# Patient Record
Sex: FEMALE | Race: ASIAN | Employment: FULL TIME | URBAN - METROPOLITAN AREA
[De-identification: names, ages, dates, MRNs, and addresses within clinical notes are randomized per-mention and may not be internally consistent; named-entity substitution may affect disease eponyms.]

---

## 2017-03-01 ENCOUNTER — OFFICE VISIT (OUTPATIENT)
Dept: FAMILY MEDICINE CLINIC | Facility: CLINIC | Age: 22
End: 2017-03-01

## 2017-03-01 ENCOUNTER — TELEPHONE (OUTPATIENT)
Dept: FAMILY MEDICINE CLINIC | Facility: CLINIC | Age: 22
End: 2017-03-01

## 2017-03-01 VITALS
WEIGHT: 107 LBS | RESPIRATION RATE: 18 BRPM | HEIGHT: 63 IN | BODY MASS INDEX: 18.96 KG/M2 | HEART RATE: 92 BPM | TEMPERATURE: 98 F | OXYGEN SATURATION: 98 % | DIASTOLIC BLOOD PRESSURE: 62 MMHG | SYSTOLIC BLOOD PRESSURE: 98 MMHG

## 2017-03-01 DIAGNOSIS — H60.392 INFECTION OF EAR LOBE, LEFT: Primary | ICD-10-CM

## 2017-03-01 PROCEDURE — 99213 OFFICE O/P EST LOW 20 MIN: CPT | Performed by: PHYSICIAN ASSISTANT

## 2017-03-01 RX ORDER — CEPHALEXIN 500 MG/1
500 CAPSULE ORAL 3 TIMES DAILY
Qty: 21 CAPSULE | Refills: 0 | Status: SHIPPED | OUTPATIENT
Start: 2017-03-01 | End: 2017-03-08

## 2017-03-01 NOTE — TELEPHONE ENCOUNTER
Pt called, thinks she may have ear infection  Asking for appt any time after 4pm.  Advised pt that Dr Dylan Craven doesn't have any appts in that time frame  Gave pt information regarding [facility] walk in clinics

## 2017-03-02 NOTE — PATIENT INSTRUCTIONS
Pierced-Ear Infection  A pierced ear can get swollen and sore. This is often due to an infection.  The possible causes for this infection include:  · Frequently touching the earlobes with dirty hands  · Ear-piercing equipment was not sterile  · Earring po · Redness and swelling don't start to get better after 2 days of treatment  · Fluid draining from your earlobe  · Not able to see the front or back side of the earrings due to swelling  For fever, call your provider right away if:  · You have a fever over

## 2017-03-02 NOTE — PROGRESS NOTES
CHIEF COMPLAINT:   Patient presents with:  Ear Pain: Left ear lobe pain at piercing for 3 days, pierced 3 weeks prior  Redness  Swelling      HPI:   beny Sky Aas is a 24year old female who presents with concerns of left ear lobe infection.  Patient first place.     Right ear lobe normal without erythema or swelling but also small soft tissue granuloma.   EYES: conjunctiva clear, sclera white  NECK: supple, non-tender  LUNGS: CTA without R/R/W  CARDIO: S1/S2 RRR   LYMPH: no lymphadenopathy or lymphangitic st

## 2017-03-30 ENCOUNTER — OFFICE VISIT (OUTPATIENT)
Dept: FAMILY MEDICINE CLINIC | Facility: CLINIC | Age: 22
End: 2017-03-30

## 2017-03-30 VITALS
OXYGEN SATURATION: 98 % | HEIGHT: 63 IN | BODY MASS INDEX: 18.96 KG/M2 | TEMPERATURE: 98 F | RESPIRATION RATE: 18 BRPM | SYSTOLIC BLOOD PRESSURE: 112 MMHG | HEART RATE: 67 BPM | WEIGHT: 107 LBS | DIASTOLIC BLOOD PRESSURE: 68 MMHG

## 2017-03-30 DIAGNOSIS — H60.392 INFECTION OF EAR LOBE, LEFT: Primary | ICD-10-CM

## 2017-03-30 PROCEDURE — 87205 SMEAR GRAM STAIN: CPT | Performed by: NURSE PRACTITIONER

## 2017-03-30 PROCEDURE — 87077 CULTURE AEROBIC IDENTIFY: CPT | Performed by: NURSE PRACTITIONER

## 2017-03-30 PROCEDURE — 99213 OFFICE O/P EST LOW 20 MIN: CPT | Performed by: NURSE PRACTITIONER

## 2017-03-30 PROCEDURE — 87070 CULTURE OTHR SPECIMN AEROBIC: CPT | Performed by: NURSE PRACTITIONER

## 2017-03-30 PROCEDURE — 87186 SC STD MICRODIL/AGAR DIL: CPT | Performed by: NURSE PRACTITIONER

## 2017-03-30 RX ORDER — SULFAMETHOXAZOLE AND TRIMETHOPRIM 800; 160 MG/1; MG/1
1 TABLET ORAL 2 TIMES DAILY
Qty: 14 TABLET | Refills: 0 | Status: SHIPPED | OUTPATIENT
Start: 2017-03-30 | End: 2017-04-06

## 2017-03-30 NOTE — PROGRESS NOTES
CHIEF COMPLAINT:   Patient presents with:  Ear Pain: Left earlobe, ongoin issue      HPI:   Holly Galdamez is a 24year old female who presents to clinic today with complaints of left ear lobe pain.  She has had a recent skin infection to the recent piercing EARS: Tragus non tender on palpation bilaterally. +Ear piercings bilaterally. Right ear lobe normal.  Left ear lobe w/ erythema, warmth, mildly tender and mildly edematous.  Upon gentle sliding of the earring, small amount of purulent drainage is expressed A pierced ear can get swollen and sore. This is often due to an infection.  The possible causes for this infection include:  · Frequently touching the earlobes with dirty hands  · Ear-piercing equipment was not sterile  · Earring posts were not sterile  · P · Redness and swelling don't start to get better after 2 days of treatment  · Fluid draining from your earlobe  · Not able to see the front or back side of the earrings due to swelling  For fever, call your provider right away if:  · You have a fever over

## 2017-05-22 ENCOUNTER — TELEPHONE (OUTPATIENT)
Dept: FAMILY MEDICINE CLINIC | Facility: CLINIC | Age: 22
End: 2017-05-22

## 2017-05-22 NOTE — TELEPHONE ENCOUNTER
Spoke with pt, has form but found out that a physical is optional    Pt just needs immunization section completed    $15 form fee, pt will bring in tomorrow afternoon and ask to speak with Alma Ramírez

## 2017-05-22 NOTE — TELEPHONE ENCOUNTER
Patient called to cancel appt for tomorrow's physical.  She wants to know if she can  a copy of medical history form? She also wants to drop off a form. She wants to know if she has to be seen? Transferred to triage.

## 2017-07-16 ENCOUNTER — OFFICE VISIT (OUTPATIENT)
Dept: FAMILY MEDICINE CLINIC | Facility: CLINIC | Age: 22
End: 2017-07-16

## 2017-07-16 VITALS
BODY MASS INDEX: 18.96 KG/M2 | DIASTOLIC BLOOD PRESSURE: 60 MMHG | HEART RATE: 66 BPM | OXYGEN SATURATION: 98 % | SYSTOLIC BLOOD PRESSURE: 110 MMHG | TEMPERATURE: 98 F | WEIGHT: 107 LBS | RESPIRATION RATE: 18 BRPM | HEIGHT: 63 IN

## 2017-07-16 DIAGNOSIS — H66.003 ACUTE SUPPURATIVE OTITIS MEDIA OF BOTH EARS WITHOUT SPONTANEOUS RUPTURE OF TYMPANIC MEMBRANES, RECURRENCE NOT SPECIFIED: Primary | ICD-10-CM

## 2017-07-16 PROCEDURE — 99213 OFFICE O/P EST LOW 20 MIN: CPT | Performed by: NURSE PRACTITIONER

## 2017-07-16 RX ORDER — AMOXICILLIN 875 MG/1
875 TABLET, COATED ORAL 2 TIMES DAILY
Qty: 20 TABLET | Refills: 0 | Status: SHIPPED | OUTPATIENT
Start: 2017-07-16 | End: 2017-07-26

## 2017-07-16 NOTE — PROGRESS NOTES
CHIEF COMPLAINT:   Patient presents with:  Ear Pain: right ear pain x 4 days  Blister on thumb and lips x 6 month on and off      HPI:   Freddy Baeza is a 24year old female who presents to clinic today with complaints of right ear pain.  Has had for 4  da EARS: wnl tragus no tender with manipulation. External auditory canals wnl. Right TM: injected with erythema, pos bulging, noretraction,poseffusion, bony landmarks not visible.   Left TM: injected with erythema, pos bulging, no retraction,pos effusion, bon You have an infection of the middle ear, the space behind the eardrum. This is also called acute otitis media (AOM). Sometimes it is caused by the common cold.  This is because congestion can block the internal passage (eustachian tube) that drains fluid fr

## 2018-09-24 ENCOUNTER — OFFICE VISIT (OUTPATIENT)
Dept: FAMILY MEDICINE CLINIC | Facility: CLINIC | Age: 23
End: 2018-09-24
Payer: COMMERCIAL

## 2018-09-24 VITALS
SYSTOLIC BLOOD PRESSURE: 104 MMHG | RESPIRATION RATE: 15 BRPM | OXYGEN SATURATION: 97 % | BODY MASS INDEX: 19.16 KG/M2 | HEIGHT: 62.5 IN | WEIGHT: 106.81 LBS | HEART RATE: 88 BPM | TEMPERATURE: 99 F | DIASTOLIC BLOOD PRESSURE: 66 MMHG

## 2018-09-24 DIAGNOSIS — L30.9 CHRONIC DERMATITIS OF HANDS: ICD-10-CM

## 2018-09-24 DIAGNOSIS — Z13.29 SCREENING FOR THYROID DISORDER: ICD-10-CM

## 2018-09-24 DIAGNOSIS — M54.9 CHRONIC MIDLINE BACK PAIN, UNSPECIFIED BACK LOCATION: ICD-10-CM

## 2018-09-24 DIAGNOSIS — Z13.0 SCREENING FOR DEFICIENCY ANEMIA: ICD-10-CM

## 2018-09-24 DIAGNOSIS — G89.29 CHRONIC MIDLINE BACK PAIN, UNSPECIFIED BACK LOCATION: ICD-10-CM

## 2018-09-24 DIAGNOSIS — Z23 NEED FOR TDAP VACCINATION: ICD-10-CM

## 2018-09-24 DIAGNOSIS — Z13.220 SCREENING, LIPID: ICD-10-CM

## 2018-09-24 DIAGNOSIS — Z00.00 WELL ADULT EXAM: Primary | ICD-10-CM

## 2018-09-24 DIAGNOSIS — Z13.1 SCREENING FOR DIABETES MELLITUS: ICD-10-CM

## 2018-09-24 PROCEDURE — 99395 PREV VISIT EST AGE 18-39: CPT | Performed by: FAMILY MEDICINE

## 2018-09-24 PROCEDURE — 90715 TDAP VACCINE 7 YRS/> IM: CPT | Performed by: FAMILY MEDICINE

## 2018-09-24 PROCEDURE — 99213 OFFICE O/P EST LOW 20 MIN: CPT | Performed by: FAMILY MEDICINE

## 2018-09-24 PROCEDURE — 90471 IMMUNIZATION ADMIN: CPT | Performed by: FAMILY MEDICINE

## 2018-09-24 RX ORDER — BETAMETHASONE DIPROPIONATE 0.5 MG/G
1 OINTMENT TOPICAL 2 TIMES DAILY
Qty: 30 G | Refills: 3 | Status: SHIPPED | OUTPATIENT
Start: 2018-09-24 | End: 2020-07-24 | Stop reason: ALTCHOICE

## 2018-09-24 NOTE — PATIENT INSTRUCTIONS
Betamethasone thin layer applied to affected areas of skin twice daily for 2-3 weeks as needed for skin condition on hands.     Vaseline or other moisturizer (Eucerin, Lubriderm or Candace lotion brands, intensive repair options)     Low back exercises and str

## 2018-09-25 PROBLEM — L30.9 CHRONIC DERMATITIS OF HANDS: Status: ACTIVE | Noted: 2018-09-25

## 2018-09-25 PROBLEM — M54.9 CHRONIC MIDLINE BACK PAIN: Status: ACTIVE | Noted: 2018-09-25

## 2018-09-25 PROBLEM — G89.29 CHRONIC MIDLINE BACK PAIN: Status: ACTIVE | Noted: 2018-09-25

## 2018-10-01 ENCOUNTER — TELEPHONE (OUTPATIENT)
Dept: FAMILY MEDICINE CLINIC | Facility: CLINIC | Age: 23
End: 2018-10-01

## 2018-10-01 DIAGNOSIS — Z13.29 SCREENING FOR THYROID DISORDER: ICD-10-CM

## 2018-10-01 DIAGNOSIS — Z13.220 SCREENING FOR HYPERLIPIDEMIA: ICD-10-CM

## 2018-10-01 DIAGNOSIS — Z13.0 SCREENING FOR DEFICIENCY ANEMIA: Primary | ICD-10-CM

## 2018-10-01 DIAGNOSIS — Z13.1 SCREENING FOR DIABETES MELLITUS: ICD-10-CM

## 2020-07-23 ENCOUNTER — HOSPITAL ENCOUNTER (OUTPATIENT)
Age: 25
Discharge: HOME | End: 2020-07-23
Payer: COMMERCIAL

## 2020-07-23 VITALS
TEMPERATURE: 99 F | RESPIRATION RATE: 20 BRPM | WEIGHT: 110 LBS | HEIGHT: 63 IN | SYSTOLIC BLOOD PRESSURE: 126 MMHG | BODY MASS INDEX: 19.49 KG/M2 | DIASTOLIC BLOOD PRESSURE: 89 MMHG | HEART RATE: 94 BPM | OXYGEN SATURATION: 98 %

## 2020-07-23 DIAGNOSIS — R31.9 HEMATURIA, UNSPECIFIED TYPE: Primary | ICD-10-CM

## 2020-07-23 LAB
POCT BILIRUBIN URINE: NEGATIVE
POCT GLUCOSE URINE: NEGATIVE MG/DL
POCT KETONE URINE: NEGATIVE MG/DL
POCT NITRITE URINE: NEGATIVE
POCT PH URINE: 6.5 (ref 5–8)
POCT PROTEIN URINE: NEGATIVE MG/DL
POCT SPECIFIC GRAVITY URINE: 1.01
POCT URINE PREGNANCY: NEGATIVE
POCT UROBILINOGEN URINE: 0.2 MG/DL

## 2020-07-23 PROCEDURE — 81025 URINE PREGNANCY TEST: CPT

## 2020-07-23 PROCEDURE — 87086 URINE CULTURE/COLONY COUNT: CPT | Performed by: PHYSICIAN ASSISTANT

## 2020-07-23 PROCEDURE — 99214 OFFICE O/P EST MOD 30 MIN: CPT

## 2020-07-23 PROCEDURE — 81002 URINALYSIS NONAUTO W/O SCOPE: CPT

## 2020-07-23 PROCEDURE — 99204 OFFICE O/P NEW MOD 45 MIN: CPT

## 2020-07-23 RX ORDER — CEPHALEXIN 500 MG/1
500 CAPSULE ORAL 3 TIMES DAILY
Qty: 15 CAPSULE | Refills: 0 | Status: SHIPPED | OUTPATIENT
Start: 2020-07-23 | End: 2020-07-28

## 2020-07-24 ENCOUNTER — OFFICE VISIT (OUTPATIENT)
Dept: FAMILY MEDICINE CLINIC | Facility: CLINIC | Age: 25
End: 2020-07-24
Payer: COMMERCIAL

## 2020-07-24 ENCOUNTER — HOSPITAL ENCOUNTER (OUTPATIENT)
Dept: ULTRASOUND IMAGING | Age: 25
Discharge: HOME | End: 2020-07-24
Attending: FAMILY MEDICINE
Payer: COMMERCIAL

## 2020-07-24 ENCOUNTER — TELEPHONE (OUTPATIENT)
Dept: FAMILY MEDICINE CLINIC | Facility: CLINIC | Age: 25
End: 2020-07-24

## 2020-07-24 VITALS
TEMPERATURE: 98 F | HEART RATE: 102 BPM | OXYGEN SATURATION: 98 % | DIASTOLIC BLOOD PRESSURE: 72 MMHG | RESPIRATION RATE: 16 BRPM | SYSTOLIC BLOOD PRESSURE: 100 MMHG | WEIGHT: 104 LBS | BODY MASS INDEX: 18.43 KG/M2 | HEIGHT: 63 IN

## 2020-07-24 DIAGNOSIS — R10.9 RIGHT LATERAL ABDOMINAL PAIN: ICD-10-CM

## 2020-07-24 DIAGNOSIS — R31.0 GROSS HEMATURIA: ICD-10-CM

## 2020-07-24 DIAGNOSIS — R31.0 GROSS HEMATURIA: Primary | ICD-10-CM

## 2020-07-24 LAB
APPEARANCE: CLEAR
BILIRUB UR QL STRIP.AUTO: NEGATIVE
BILIRUBIN: NEGATIVE
CLARITY UR REFRACT.AUTO: CLEAR
GLUCOSE (URINE DIPSTICK): NEGATIVE MG/DL
GLUCOSE UR STRIP.AUTO-MCNC: NEGATIVE MG/DL
KETONES (URINE DIPSTICK): NEGATIVE MG/DL
KETONES UR STRIP.AUTO-MCNC: NEGATIVE MG/DL
MULTISTIX LOT#: NORMAL NUMERIC
NITRITE UR QL STRIP.AUTO: NEGATIVE
NITRITE, URINE: NEGATIVE
PH UR STRIP.AUTO: 6 [PH] (ref 4.5–8)
PH, URINE: 6 (ref 4.5–8)
PROT UR STRIP.AUTO-MCNC: NEGATIVE MG/DL
PROTEIN (URINE DIPSTICK): NEGATIVE MG/DL
SP GR UR STRIP.AUTO: <1.005 (ref 1–1.03)
SPECIFIC GRAVITY: 1.01 (ref 1–1.03)
URINE-COLOR: YELLOW
UROBILINOGEN UR STRIP.AUTO-MCNC: <2 MG/DL
UROBILINOGEN,SEMI-QN: 0.2 MG/DL (ref 0–1.9)

## 2020-07-24 PROCEDURE — 3074F SYST BP LT 130 MM HG: CPT | Performed by: FAMILY MEDICINE

## 2020-07-24 PROCEDURE — 3078F DIAST BP <80 MM HG: CPT | Performed by: FAMILY MEDICINE

## 2020-07-24 PROCEDURE — 76770 US EXAM ABDO BACK WALL COMP: CPT | Performed by: FAMILY MEDICINE

## 2020-07-24 PROCEDURE — 3008F BODY MASS INDEX DOCD: CPT | Performed by: FAMILY MEDICINE

## 2020-07-24 PROCEDURE — 99213 OFFICE O/P EST LOW 20 MIN: CPT | Performed by: FAMILY MEDICINE

## 2020-07-24 PROCEDURE — 81003 URINALYSIS AUTO W/O SCOPE: CPT | Performed by: FAMILY MEDICINE

## 2020-07-24 PROCEDURE — 81001 URINALYSIS AUTO W/SCOPE: CPT | Performed by: FAMILY MEDICINE

## 2020-07-24 NOTE — ED INITIAL ASSESSMENT (HPI)
Patient presents with cc of hematuria today(BRB)abd and RLQ abdominal pain that presented intermittently today a 1am and 5pm today. Denies nausea,vomiting or diarrhea. No BM x 2 days. Also for past month or so has felt lightheaded when standing up.

## 2020-07-24 NOTE — PROGRESS NOTES
Elton Garcia IS A 21year old female [address] [facility] [place] Patient presents with:  Urgent Care F/u: for blood in urine and lower abdominal pain.         History of present illness:     1 AM had some discomfort (sharp pain R side) then again 5 PM.     Urgency felt at 5 PM Physical activity:        Days per week: Not on file        Minutes per session: Not on file      Stress: Not on file    Relationships      Social connections:        Talks on phone: Not on file        Gets together: Not on file        Attends [employer] • PH, Urine 07/23/2020 6.5    • Protein urine 07/23/2020 Negative    • Urobilinogen urine 07/23/2020 0.2    • Nitrite Urine 07/23/2020 Negative    • Leukocyte esterase urine 07/23/2020 Trace*   • POCT Urine Pregnancy 07/23/2020 Negative          Assessment · Kidney or bladder stones. These are collections of crystals. They form in the urine. Stones may be found anywhere in the urinary tract. But they form most often in the kidneys or bladder. In addition to blood in the urine, they can cause severe pain.   ·

## 2020-07-24 NOTE — ED PROVIDER NOTES
Patient Seen in: [facility]      History   Patient presents with:  Urinary Symptoms  Abdominal Pain  Lightheadedness    Stated Complaint: BLOOD IN URINE/ ABDOMINAL PAIN X 2 DAYS    HPI    Leah Alfaro is a 26-year-old female who pres (Approximate)   SpO2 98%   BMI 19.49 kg/m²         Physical Exam    Nursing note reviewed. Vital signs reviewed. Constitutional: Oriented to person, place, and time. Patient appears well-developed and well-nourished, non-toxic and in no acute distress.   H patient is completely stable in the urgent care and is not in any pain currently. The patient is encouraged to return if any concerning symptoms arise. Additional verbal discharge instructions are given and discussed. Discharge medications are discussed.

## 2020-07-24 NOTE — TELEPHONE ENCOUNTER
Future Appointments   Date Time Provider Leisa Angeles   7/24/2020  1:20 PM Natalie Trent MD [facility]     Yes this is OK

## 2020-07-24 NOTE — PATIENT INSTRUCTIONS
I suspect you had a kidney stone. we will check blood tests: complete blood count and kidney function.  Call [phone] to schedule toe lab test.     Urinalysis yesterday suggested non-infectious bleeding, but will check  the culture in process (ordered b urinary tract may cause blood in the urine. This damage may be due to a blow or accident. It may also result from the use of a urinary catheter. Very hard exercise may sometimes irritate the urinary tract and cause bleeding.   · Cancer may occur anywhere in

## 2020-07-24 NOTE — TELEPHONE ENCOUNTER
Patient placed themselves on the schedule for today with dr Xiomara Koch for blood in urine, patient said she went to UC last night and was told to see  today       Is this ok to keep on , and should it be longer the 20 minutes ? ??

## 2020-07-26 LAB
ABSOLUTE BASOPHILS: 28 CELLS/UL (ref 0–200)
ABSOLUTE EOSINOPHILS: 38 CELLS/UL (ref 15–500)
ABSOLUTE LYMPHOCYTES: 1955 CELLS/UL (ref 850–3900)
ABSOLUTE MONOCYTES: 357 CELLS/UL (ref 200–950)
ABSOLUTE NEUTROPHILS: 2322 CELLS/UL (ref 1500–7800)
BASOPHILS: 0.6 %
BUN/CREATININE RATIO: 9 (CALC) (ref 6–22)
BUN: 6 MG/DL (ref 7–25)
CALCIUM: 9.2 MG/DL (ref 8.6–10.2)
CARBON DIOXIDE: 28 MMOL/L (ref 20–32)
CHLORIDE: 102 MMOL/L (ref 98–110)
CREATININE: 0.68 MG/DL (ref 0.5–1.1)
EGFR IF AFRICN AM: 141 ML/MIN/1.73M2
EGFR IF NONAFRICN AM: 122 ML/MIN/1.73M2
EOSINOPHILS: 0.8 %
GLUCOSE: 84 MG/DL (ref 65–99)
HEMATOCRIT: 42.5 % (ref 35–45)
HEMOGLOBIN: 14.2 G/DL (ref 11.7–15.5)
LYMPHOCYTES: 41.6 %
MCH: 31.3 PG (ref 27–33)
MCHC: 33.4 G/DL (ref 32–36)
MCV: 93.6 FL (ref 80–100)
MONOCYTES: 7.6 %
MPV: 11.3 FL (ref 7.5–12.5)
NEUTROPHILS: 49.4 %
PLATELET COUNT: 247 THOUSAND/UL (ref 140–400)
POTASSIUM: 4.2 MMOL/L (ref 3.5–5.3)
RDW: 11.4 % (ref 11–15)
RED BLOOD CELL COUNT: 4.54 MILLION/UL (ref 3.8–5.1)
SODIUM: 138 MMOL/L (ref 135–146)
WHITE BLOOD CELL COUNT: 4.7 THOUSAND/UL (ref 3.8–10.8)

## 2020-07-28 ENCOUNTER — TELEPHONE (OUTPATIENT)
Dept: FAMILY MEDICINE CLINIC | Facility: CLINIC | Age: 25
End: 2020-07-28

## 2020-07-28 NOTE — TELEPHONE ENCOUNTER
Called patient and informed  has reviewed all test results and they are normal and do not show any infection. There is not a reason to come to office. Nothing else we can do for her here.    Dr. Renata Eddy recommendation is to go to ED for further ev

## 2022-12-30 ENCOUNTER — HOSPITAL ENCOUNTER (OUTPATIENT)
Age: 27
Discharge: HOME | End: 2022-12-30
Payer: COMMERCIAL

## 2022-12-30 VITALS
OXYGEN SATURATION: 98 % | DIASTOLIC BLOOD PRESSURE: 76 MMHG | SYSTOLIC BLOOD PRESSURE: 117 MMHG | RESPIRATION RATE: 16 BRPM | TEMPERATURE: 98 F | HEART RATE: 98 BPM

## 2022-12-30 DIAGNOSIS — N61.0 CELLULITIS OF RIGHT BREAST: Primary | ICD-10-CM

## 2022-12-30 PROCEDURE — 99213 OFFICE O/P EST LOW 20 MIN: CPT

## 2022-12-30 RX ORDER — CEPHALEXIN 500 MG/1
500 CAPSULE ORAL 4 TIMES DAILY
Qty: 40 CAPSULE | Refills: 0 | Status: SHIPPED | OUTPATIENT
Start: 2022-12-30 | End: 2023-01-09

## 2022-12-30 NOTE — ED INITIAL ASSESSMENT (HPI)
Patient presents to IC with c/o right breast pain and redness x 4 days. Squeezed a pimple and got some purulent drainage. No fever.

## 2022-12-30 NOTE — DISCHARGE INSTRUCTIONS
Take Keflex as prescribed. Warm compresses. Tylenol and ibuprofen for pain. Follow closely with your primary. ER if worse.

## (undated) NOTE — MR AVS SNAPSHOT
[facility]  [address] [facility]  [place] [address]  [phone]               Thank you for choosing us for your health care visit with TUNDE Pederson. We are glad to serve you and happy to provide you with this summary of your visit.   Darin · Always clean the earrings, posts, and your earlobe with soap and water before putting in earrings. · Keep the clasp loose to allow space on either side of your earlobe.   · After you have worn the earrings for at least 6 weeks, remove them at bedtime eac This list is accurate as of: 3/30/17  6:23 PM.  Always use your most recent med list.                Sulfamethoxazole-TMP -160 MG Tabs per tablet   Take 1 tablet by mouth 2 (two) times daily.    Commonly known as:  BACTRIM DS                Where to G

## (undated) NOTE — MR AVS SNAPSHOT
[facility]  [address] [facility]  [place] [address] [phone]               Thank you for choosing us for your health care visit with SHARMIN Mendosa. We are glad to serve you and happy to provide you with this summary of your visit.   Please h prescribed. Note: Talk with your provider before using these medicines if you have chronic liver or kidney disease, or ever had a stomach ulcer or GI (gastrointestinal) bleeding. · Finish any antibiotics you were given.   Prevention  · Use only [employer] go [address]. All rights reserved. This information is not intended as a substitute for professional medical care. Always follow your healthcare professional's instructions.              Allergies as of Mar 01, 2017     No Known Allergies                Today's Vi